# Patient Record
Sex: FEMALE | Race: WHITE | NOT HISPANIC OR LATINO | Employment: OTHER
[De-identification: names, ages, dates, MRNs, and addresses within clinical notes are randomized per-mention and may not be internally consistent; named-entity substitution may affect disease eponyms.]

---

## 2017-07-01 ENCOUNTER — HEALTH MAINTENANCE LETTER (OUTPATIENT)
Age: 52
End: 2017-07-01

## 2021-12-23 ENCOUNTER — TRANSCRIBE ORDERS (OUTPATIENT)
Dept: OTHER | Age: 56
End: 2021-12-23

## 2021-12-23 DIAGNOSIS — C25.9 PANCREATIC CANCER (H): Primary | ICD-10-CM

## 2021-12-24 ENCOUNTER — PATIENT OUTREACH (OUTPATIENT)
Dept: ONCOLOGY | Facility: CLINIC | Age: 56
End: 2021-12-24
Payer: COMMERCIAL

## 2021-12-24 NOTE — PROGRESS NOTES
Review of Oncology referral fr Dr Hamilton/ [facility] for pt with pancreatic adenocarcinoma.    12/9/21 panc mass EUS bx at [facility] confirms adenocarcinoma ductal, all imaging at [facility] (CT CAP, MRI)    Pt Med Onc & Surg Onc at [facility], neoadj chemo recommended. Cirrhotic liver (?) will see hepatology/ Dr Dempsey at [facility].     Will proceed to scheduling next available Med Onc at [facility] for pancreatic cancer (requesting Ayesha).

## 2021-12-28 NOTE — TELEPHONE ENCOUNTER
RECORDS STATUS - ALL OTHER DIAGNOSIS      RECORDS RECEIVED FROM: JOSELITO Lomeli   DATE RECEIVED:    NOTES STATUS DETAILS   OFFICE NOTE from referring provider CE - [facility]: 12/14/21   OFFICE NOTE from medical oncologist CE - [facility] Armstrong: 12/22/21   DISCHARGE SUMMARY from hospital CE  [facility] 12/9/21   DISCHARGE REPORT from the ER NA    OPERATIVE REPORT CE - [facility] 12/9/21: EUS FNA   MEDICATION LIST  [facility]   CLINICAL TRIAL TREATMENTS TO DATE     LABS     PATHOLOGY REPORTS [facility], Report in CE, slides received 1/5 12/9/21: [id number]   ANYTHING RELATED TO DIAGNOSIS Epic 12/21/21   GENONOMIC TESTING     TYPE:     IMAGING (NEED IMAGES & REPORT)     XR ERCP PACS 12/9/21: [facility]   CT SCANS PACS 12/21/21, 12/9/21, 10/29/21: [facility]    1/31/13: [facility]   MRI PACS 12/21/21: [facility]   MAMMO     ULTRASOUND PACS 12/8/21, 2/29/20: [facility]    1/29/13: [facility]   PET

## 2021-12-31 ENCOUNTER — PRE VISIT (OUTPATIENT)
Dept: ONCOLOGY | Facility: CLINIC | Age: 56
End: 2021-12-31
Payer: COMMERCIAL

## 2021-12-31 ENCOUNTER — VIRTUAL VISIT (OUTPATIENT)
Dept: ONCOLOGY | Facility: CLINIC | Age: 56
End: 2021-12-31
Attending: INTERNAL MEDICINE
Payer: COMMERCIAL

## 2021-12-31 DIAGNOSIS — C25.8 OVERLAPPING MALIGNANT NEOPLASM OF PANCREAS (H): Primary | ICD-10-CM

## 2021-12-31 PROCEDURE — 99205 OFFICE O/P NEW HI 60 MIN: CPT | Mod: 95 | Performed by: INTERNAL MEDICINE

## 2021-12-31 PROCEDURE — 999N001193 HC VIDEO/TELEPHONE VISIT; NO CHARGE

## 2021-12-31 RX ORDER — POLYETHYLENE GLYCOL 3350 17 G/17G
17 POWDER, FOR SOLUTION ORAL PRN
COMMUNITY
Start: 2021-12-14

## 2021-12-31 RX ORDER — LAMOTRIGINE 100 MG/1
100 TABLET ORAL AT BEDTIME
COMMUNITY
Start: 2021-12-15

## 2021-12-31 RX ORDER — MULTIPLE VITAMINS W/ MINERALS TAB 9MG-400MCG
1 TAB ORAL DAILY
COMMUNITY
Start: 2021-09-20

## 2021-12-31 RX ORDER — SENNOSIDES A AND B 8.6 MG/1
8.6 TABLET, FILM COATED ORAL PRN
COMMUNITY
Start: 2021-12-12

## 2021-12-31 RX ORDER — BUPROPION HYDROCHLORIDE 150 MG/1
450 TABLET ORAL DAILY
COMMUNITY
Start: 2021-12-15

## 2021-12-31 RX ORDER — CLOBETASOL PROPIONATE 0.5 MG/G
OINTMENT TOPICAL PRN
COMMUNITY
Start: 2021-05-03

## 2021-12-31 RX ORDER — SERTRALINE HYDROCHLORIDE 100 MG/1
100 TABLET, FILM COATED ORAL EVERY MORNING
COMMUNITY
Start: 2021-12-15

## 2021-12-31 RX ORDER — TRIAMCINOLONE ACETONIDE 1 MG/G
CREAM TOPICAL PRN
COMMUNITY
Start: 2021-05-03

## 2021-12-31 RX ORDER — GABAPENTIN 300 MG/1
600 CAPSULE ORAL AT BEDTIME
COMMUNITY
Start: 2021-12-12

## 2021-12-31 RX ORDER — OXYCODONE HYDROCHLORIDE 5 MG/1
5-10 TABLET ORAL EVERY 6 HOURS PRN
COMMUNITY
Start: 2021-12-17

## 2021-12-31 NOTE — PROGRESS NOTES
Service Date: 12/31/2021    VIRTUAL VISIT    MEDICAL ONCOLOGY NEW PATIENT VISIT    REFERRING PHYSICIAN:    1.  Dr. Reyna Hamilton, [place], [place].    2.  Dr. Gian Armstrong, [facility], Surgery Service.    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  The patient is referred by Dr. Hamilton for a second opinion.    HISTORY OF PRESENT ILLNESS:  Ms. Chu is a 56-year-old woman from [place].  She joins me for [employer]-based virtual video visit along with her daughter, Ashlee, who states she works in our Bone [employer]row Transplant Office here at the [facility] and is familiar with clinical trials.      Ms. Sherita Chu has a history of self-professed autoimmune disorder that she later described as psoriasis, alcoholism and she has been sober since 2013, history of bipolar disease, atrial flutter in 08/2011 while intoxicated and a question of liver failure that is being evaluated further in terms of possible cirrhosis.  She has an upcoming appointment with Dr. Dempsey at [facility] in the coming week and a half.  She was motivated to come for a second opinion for recently diagnosed pancreatic head adenocarcinoma as follows.      She developed obstructive jaundice in late November or early December.  A CT scan of the abdomen and pelvis on 12/09/2021 showed dilated common bile duct, intrahepatic bile duct and no laine pancreatic mass or cholangiocarcinoma-like mass was seen.  She was referred to Dr. Juan Antonio Lorenzo from Gastroenterology who performed endoscopy on the same day.  ERCP was done 12/09.  A bile duct brushing was negative in terms of cytology, but FNA was positive for adenocarcinoma consistent with pancreatic ductal carcinoma of the pancreatic head.  Alkaline phosphatase was elevated at 336, total bilirubin  "is mildly elevated at 1.9. On 12/12, a CA 19-9 was checked for the first time and was extremely highly elevated at 5820.  It was rechecked a week later on 12/21/2021 and again highly elevated at 5654.      She met with medical oncologist, Dr. Hamilton, on 12/14/2021 who recommended chemotherapy and evaluation by Dr. Armstrong from the Surgery Service at [facility].  Ms. Chu met with Dr. Armstrong on 12/21 and recommended up-front chemotherapy.  She has not yet started chemotherapy.  It is scheduled for on or around 12/10.  Port placement is also scheduled for the coming week.  I see in documentation that Dr. Hamilton had recommended the patient become vaccinated for COVID vaccination as Ms. Chu was applying for a petition for COVID vaccine-related examination, not otherwise specified.      Ms. Chu shares with me today she has several medical issues including mouth sores of unclear etiology.  She has neuropathy, not otherwise specified but possibly due to what she terms as a prediabetic diagnosis that is worse in her feet.  She has had it for the last few years and it is worse at night.  She describes an \"autoimmune disorder.\" When I asked her to elaborate further, she describes psoriasis, but she has not seen a rheumatologist for many years.  There is a question of possible cirrhosis that is documented in the medical record due to history of alcoholism.  She is sober since 2013 but drank heavily for about 8 years.  She has documented bipolar disorder per the outside medical records as well.  She tells me several points before becoming sober 2013 she was hospitalized for liver failure, but she did not follow up with a hepatologist.  During 1 episode of intoxication, per the medical record, had atrial flutter documented on 08/20/2021.    Past medical history/PAST SURGICAL HISTORY:  Per above, and additionally most notable for the ERCP and evaluation done as described above.    SOCIAL HISTORY:  She lives " in [place].  Her daughter, Ashlee, works in the Bone [employer]row Transplant Office at the [facility] and accompanies her today on the call.  She works as a .  She smokes about 1/4 pack a day, but at her peak, she smoked a pack a day.  Overall, she has smoked cigarettes for 34 years.  When she was drinking alcohol, she drank a maximum of a pint or 2 per day for over an 8-year period most heavily.  She quit and has been sober since 2013.  I congratulated her on her sobriety.  She smokes occasional recreational marijuana and her daughter states it has not been in the last year.  She denies any IV drug use when I asked about the potential risk factors for hepatitis B, hepatitis C, or HIV.    FAMILY HISTORY:  Family history of malignancy is notable as she says 2 people in her family have had pancreas cancer including at least 1 with metastatic burden, per the outside record.  Her mother had colon cancer.  Paternal grandmother had lymphoma.      REVIEW OF SYSTEMS:  A full 14-point review of systems was performed. Pertinent symptoms are reviewed above per HPI.    MEDICATIONS AND ALLERGIES:  Reviewed in Epic.  Current Outpatient Medications   Medication     albuterol (ALBUTEROL) 108 (90 BASE) MCG/ACT inhaler     buPROPion (WELLBUTRIN XL) 150 MG 24 hr tablet     buPROPion (WELLBUTRIN XL) 300 MG 24 hr tablet     clobetasol (TEMOVATE) 0.05 % external ointment     gabapentin (NEURONTIN) 300 MG capsule     lamoTRIgine (LAMICTAL) 100 MG tablet     multivitamin w/minerals (CERTAVITE/ANTIOXIDANTS) tablet     oxyCODONE (ROXICODONE) 5 MG tablet     polyethylene glycol (MIRALAX) 17 GM/Dose powder     senna (SENOKOT) 8.6 MG tablet     sertraline (ZOLOFT) 100 MG tablet     triamcinolone (KENALOG) 0.1 % external cream     ARIPiprazole (ABILIFY PO)     B Complex Vitamins (VITAMIN B COMPLEX PO)     cetirizine (ZYRTEC) 10 MG tablet     Citalopram Hydrobromide (CELEXA PO)     DIAZEPAM PO     ibuprofen (ADVIL,MOTRIN)  400 MG tablet     ibuprofen (ADVIL,MOTRIN) 600 MG tablet     LANsoprazole (PREVACID) 30 MG capsule     mirtazapine (REMERON) 30 MG tablet     Multiple Vitamin (MULTI-VITAMIN) per tablet     naltrexone (DEPADE;REVIA) 50 MG tablet     oxybutynin (DITROPAN) 5 MG tablet     prazosin (MINIPRESS) 2 MG capsule     QUEtiapine (SEROQUEL) 25 MG tablet     Topiramate (TOPAMAX PO)     traZODone (DESYREL) 100 MG tablet     No current facility-administered medications for this visit.        Allergies   Allergen Reactions     Nka [No Known Allergies]      Zolpidem Other (See Comments)     Delirium         PHYSICAL EXAMINATION: Physical exam could not be performed today in context of a Virtual Visit during the COVID19/Coronavirus pandemic.  Vitals - Patient Reported  Weight (Patient Reported): 95.3 kg (210 lb)  Pain Score: No Pain (0)         Observed physical assessments made today by visualizing the patient by video link:    General/Constitutional: Generally appears well, not acutely ill.  HEENT: no scleral icterus, not jaundiced [initial presentation of jaundice was reported to have been alleviated post-stent placement].  Respiratory: no labored breathing.  Musculoskeletal: appears to have full range of motion and adequate physical strength.  Skin: no jaundice, discoloration or other notable lesions.  Neurological: no evidence of tremors.  Psychiatric: no evident anxiety; fully alert and oriented with fluent speech.      The rest of a comprehensive physical examination is deferred due to PHE (public health emergency) video visit restrictions.    Pertinent labs, pathology and imaging were reviewed by me through Care Everywhere system including notes by referring physicians, Dr. Hamilton and Dr. Armstrong, CA  19-9 levels provided above.  The CT and MRI abdomen was further done to elaborate and clarify whether or not she has cirrhosis.  The MRI abdomen done on 12/21 also, interestingly, could not very well visualize the by then  biopsied pancreatic head mass.  Interval placement of distal common bile duct was seen and the obstruction has been resolved by that point.  There was no evidence of metastatic disease in the abdomen or other suspicious liver lesions noted.    ASSESSMENT AND PLAN:  Ms. Sherita Chu is a 56-year-old woman from [place] with a history of alcoholism, possible issues with liver dysfunction, possible autoimmune disorder such as psoriasis and underlying neuropathy from what she describes as prediabetes.  She has a fair performance status I would characterize as probably an ECOG 1.      She comes for a second opinion regarding newly diagnosed pancreatic head adenocarcinoma that, despite imaging, does not show any laine evidence of distant metastatic disease and the tumor does appear resectable.  There is a highly elevated CA 19-9 above 5000 which in my view presents strong suspicion that she has occult distant metastatic disease not visualized on the scans.  I reviewed the natural course, biology and treatment of pancreatic adenocarcinomas in general.  I explained only 5%-10% of them are potentially hereditary in nature.  Other risk factors include, in her history in particular, smoking and alcohol abuse, potentially other issues and I am not certain how the prediabetes would fit there.  If she has truly neuropathy, I would suspect she may even have full blown diabetes, but regarding noncancer related issues today and other questions she had regarding GGT and other liver function tests, I will steer her towards her primary care physician, Dr. Hamilton or the upcoming appointment with the liver specialist at [facility].     In terms of answering questions about general strategy and chemotherapy regimens, I reviewed that, for potentially resectable carcinomas of the pancreas, the standard of care established by the initial CONKO trial was surgical resection followed by risk reduction-intent chemotherapy in the  postoperative adjuvant setting.  Traditionally, it has been gemcitabine and in the more recent years has grown to include options of gemcitabine with oral capecitabine or FOLFIRINOX.      However, in the last 5-10 years, many subspecialty centers including ours are giving more chemotherapy up front as a way to ensure that chemotherapy is given, citing as much as high as 40% risk factor of complication for Whipple procedures and also to help increase the rates of R0 resection.  I think that would be even more wise in this case and justified due to the high CA 19-9 that, barring any significant or acute pancreatitis or other issues at the time those labs were drawn, may indicate occult metastatic disease in which case the patient would not benefit from any form of surgical resection with intent to cure but rather chemotherapy with palliative intent.  I described that the regimens are borrowed from experience in the stage IV metastatic setting including FOLFIRINOX or gemcitabine and nab-paclitaxel. Due to her history of oral mouth sores, neuropathy and other medical issues, I would suggest not going with FOLFIRINOX as initial risk factor of neuropathy with oxaliplatin which induces cold sensory neuropathy and risk of steatohepatitis and steatosis that may occur by a prolonged exposure to oxaliplatin.  I would favor gemcitabine and nab-paclitaxel regimen.  I explained per the MPACT Trial has been FDA approved for use in stage IV pancreas carcinomas and often used in the neoadjuvant setting.  Although the regimen is given on days 1, 8, 15 of a 28-day cycle, the presentation at the GI ASCO Symposium in 01/2015 by Dr. Pablo Madrigal, at the time at [facility] and now [facility], showed that dropping day 8 does not compromise efficacy but actually makes for much more tolerable form of the regimen.     For that reason, since that time, I routinely have been administering gemcitabine and nab-paclitaxel  on days 1 and 15 and not on day 8.  Thus, it is given every 14 days; gemcitabine at standard dose of 1000 mg/m2; nab-paclitaxel at 125 mg/m2.  I did  that there would be risk of myelosuppression, increased risk of infection, increased risk of bleeding due to thrombocytopenia that may be induced by chemotherapy, risk of exacerbation of her underlying mouth sores, rashes and nab-paclitaxel also having some potential to induce or exacerbate underlying neuropathy.  I reviewed all of the above.      She has an upcoming 01/10/2022 appointment with Dr. Hamilton, her primary oncologist, after meeting with the hepatologist there.  She will return to the care of Dr. Hamilton and Dr. Armstrong.  I appreciate the opportunity to meet this kind woman.  I answered their questions as best I could.  I referred them to cancer.net and [employer] which are excellent online resources for the lay public.  Thank you for the referral.  I would be happy to meet with her again at any time.      VIRTUAL VISIT - DETAILS:    I have reviewed the note as documented above. This accurately captures the substance of my conversation with the patient.    Date of call: December 31, 2021   Start of call: 7:39 am  End of call: 8:20 am    Provider location: [facility] (academic office)  Patient location: Home      Mode of Video Visit: [facility]             I spent 40 minutes in consultation, including history and discussion with the patient including review of recent lab values and radiologic imaging results.  An additional 25 minutes was spent on the day of the visit, including reviewing pertinent medical notes and documentation from other physicians and APPs who have evaluated and treated this patient, pertinent lab values, pathology and imaging results, personal review of radiologic images, discussing the case with referring providers and/or nurse coordinator team, post-visit orders, and all post-visit documentation.    Nicholas Carey MD  PhD          D: 2021   T: 2021   MT: sandy    Name:     ZHANNA RUEDA  MRN:      [id number]        Account:      [id number]   :      1965           Service Date: 2021       Document: [id number]

## 2021-12-31 NOTE — LETTER
12/31/2021         RE: Sherita Chu  [address]  [place] [address]        Dear Colleague,    Thank you for referring your patient, Sherita Chu, to the [facility]. Please see a copy of my visit note below.    Service Date: 12/31/2021    VIRTUAL VISIT    MEDICAL ONCOLOGY NEW PATIENT VISIT    REFERRING PHYSICIAN:    1.  Dr. Reyna Hamilton, [place], [place].    2.  Dr. Gian Armstrong, [facility], Surgery Service.    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  The patient is referred by Dr. Hamilton for a second opinion.    HISTORY OF PRESENT ILLNESS:  Ms. Chu is a 56-year-old woman from [place].  She joins me for [employer]-based virtual video visit along with her daughter, Ashlee, who states she works in our Bone [employer] Transplant Office here at the [employer] and is familiar with clinical trials.      Ms. Sherita Chu has a history of self-professed autoimmune disorder that she later described as psoriasis, alcoholism and she has been sober since 2013, history of bipolar disease, atrial flutter in 08/2011 while intoxicated and a question of liver failure that is being evaluated further in terms of possible cirrhosis.  She has an upcoming appointment with Dr. Dempsey at [facility] in the coming week and a half.  She was motivated to come for a second opinion for recently diagnosed pancreatic head adenocarcinoma as follows.      She developed obstructive jaundice in late November or early December.  A CT scan of the abdomen and pelvis on 12/09/2021 showed dilated common bile duct, intrahepatic bile duct and no laine pancreatic mass or cholangiocarcinoma-like mass was seen.  She was referred to Dr. Juan Antonio Lorenzo from Gastroenterology who performed  "endoscopy on the same day.  ERCP was done 12/09.  A bile duct brushing was negative in terms of cytology, but FNA was positive for adenocarcinoma consistent with pancreatic ductal carcinoma of the pancreatic head.  Alkaline phosphatase was elevated at 336, total bilirubin is mildly elevated at 1.9. On 12/12, a CA 19-9 was checked for the first time and was extremely highly elevated at 5820.  It was rechecked a week later on 12/21/2021 and again highly elevated at 5654.      She met with medical oncologist, Dr. Hamilton, on 12/14/2021 who recommended chemotherapy and evaluation by Dr. Armstrong from the Surgery Service at [facility].  Ms. Chu met with Dr. Armstrong on 12/21 and recommended up-front chemotherapy.  She has not yet started chemotherapy.  It is scheduled for on or around 12/10.  Port placement is also scheduled for the coming week.  I see in documentation that Dr. Hamilton had recommended the patient become vaccinated for COVID vaccination as Ms. Chu was applying for a petition for COVID vaccine-related examination, not otherwise specified.      Ms. Chu shares with me today she has several medical issues including mouth sores of unclear etiology.  She has neuropathy, not otherwise specified but possibly due to what she terms as a prediabetic diagnosis that is worse in her feet.  She has had it for the last few years and it is worse at night.  She describes an \"autoimmune disorder.\" When I asked her to elaborate further, she describes psoriasis, but she has not seen a rheumatologist for many years.  There is a question of possible cirrhosis that is documented in the medical record due to history of alcoholism.  She is sober since 2013 but drank heavily for about 8 years.  She has documented bipolar disorder per the outside medical records as well.  She tells me several points before becoming sober 2013 she was hospitalized for liver failure, but she did not follow up with a hepatologist.  " During 1 episode of intoxication, per the medical record, had atrial flutter documented on 08/20/2021.    Past medical history/PAST SURGICAL HISTORY:  Per above, and additionally most notable for the ERCP and evaluation done as described above.    SOCIAL HISTORY:  She lives in [place].  Her daughter, Ashlee, works in the Bone Marrow Transplant Office at the [employer] and accompanies her today on the call.  She works as a .  She smokes about 1/4 pack a day, but at her peak, she smoked a pack a day.  Overall, she has smoked cigarettes for 34 years.  When she was drinking alcohol, she drank a maximum of a pint or 2 per day for over an 8-year period most heavily.  She quit and has been sober since 2013.  I congratulated her on her sobriety.  She smokes occasional recreational marijuana and her daughter states it has not been in the last year.  She denies any IV drug use when I asked about the potential risk factors for hepatitis B, hepatitis C, or HIV.    FAMILY HISTORY:  Family history of malignancy is notable as she says 2 people in her family have had pancreas cancer including at least 1 with metastatic burden, per the outside record.  Her mother had colon cancer.  Paternal grandmother had lymphoma.      REVIEW OF SYSTEMS:  A full 14-point review of systems was performed. Pertinent symptoms are reviewed above per HPI.    MEDICATIONS AND ALLERGIES:  Reviewed in Epic.  Current Outpatient Medications   Medication     albuterol (ALBUTEROL) 108 (90 BASE) MCG/ACT inhaler     buPROPion (WELLBUTRIN XL) 150 MG 24 hr tablet     buPROPion (WELLBUTRIN XL) 300 MG 24 hr tablet     clobetasol (TEMOVATE) 0.05 % external ointment     gabapentin (NEURONTIN) 300 MG capsule     lamoTRIgine (LAMICTAL) 100 MG tablet     multivitamin w/minerals (CERTAVITE/ANTIOXIDANTS) tablet     oxyCODONE (ROXICODONE) 5 MG tablet     polyethylene glycol (MIRALAX) 17 GM/Dose powder     senna (SENOKOT) 8.6 MG tablet      sertraline (ZOLOFT) 100 MG tablet     triamcinolone (KENALOG) 0.1 % external cream     ARIPiprazole (ABILIFY PO)     B Complex Vitamins (VITAMIN B COMPLEX PO)     cetirizine (ZYRTEC) 10 MG tablet     Citalopram Hydrobromide (CELEXA PO)     DIAZEPAM PO     ibuprofen (ADVIL,MOTRIN) 400 MG tablet     ibuprofen (ADVIL,MOTRIN) 600 MG tablet     LANsoprazole (PREVACID) 30 MG capsule     mirtazapine (REMERON) 30 MG tablet     Multiple Vitamin (MULTI-VITAMIN) per tablet     naltrexone (DEPADE;REVIA) 50 MG tablet     oxybutynin (DITROPAN) 5 MG tablet     prazosin (MINIPRESS) 2 MG capsule     QUEtiapine (SEROQUEL) 25 MG tablet     Topiramate (TOPAMAX PO)     traZODone (DESYREL) 100 MG tablet     No current facility-administered medications for this visit.        Allergies   Allergen Reactions     Nka [No Known Allergies]      Zolpidem Other (See Comments)     Delirium         PHYSICAL EXAMINATION: Physical exam could not be performed today in context of a Virtual Visit during the COVID19/Coronavirus pandemic.  Vitals - Patient Reported  Weight (Patient Reported): 95.3 kg (210 lb)  Pain Score: No Pain (0)         Observed physical assessments made today by visualizing the patient by video link:    General/Constitutional: Generally appears well, not acutely ill.  HEENT: no scleral icterus, not jaundiced [initial presentation of jaundice was reported to have been alleviated post-stent placement].  Respiratory: no labored breathing.  Musculoskeletal: appears to have full range of motion and adequate physical strength.  Skin: no jaundice, discoloration or other notable lesions.  Neurological: no evidence of tremors.  Psychiatric: no evident anxiety; fully alert and oriented with fluent speech.      The rest of a comprehensive physical examination is deferred due to PHE (public health emergency) video visit restrictions.    Pertinent labs, pathology and imaging were reviewed by me through Care Everywhere system including notes by  referring physicians, Dr. Hamilton and Dr. Armstrong, CA  19-9 levels provided above.  The CT and MRI abdomen was further done to elaborate and clarify whether or not she has cirrhosis.  The MRI abdomen done on 12/21 also, interestingly, could not very well visualize the by then biopsied pancreatic head mass.  Interval placement of distal common bile duct was seen and the obstruction has been resolved by that point.  There was no evidence of metastatic disease in the abdomen or other suspicious liver lesions noted.    ASSESSMENT AND PLAN:  Ms. Sherita Chu is a 56-year-old woman from [place] with a history of alcoholism, possible issues with liver dysfunction, possible autoimmune disorder such as psoriasis and underlying neuropathy from what she describes as prediabetes.  She has a fair performance status I would characterize as probably an ECOG 1.      She comes for a second opinion regarding newly diagnosed pancreatic head adenocarcinoma that, despite imaging, does not show any laine evidence of distant metastatic disease and the tumor does appear resectable.  There is a highly elevated CA 19-9 above 5000 which in my view presents strong suspicion that she has occult distant metastatic disease not visualized on the scans.  I reviewed the natural course, biology and treatment of pancreatic adenocarcinomas in general.  I explained only 5%-10% of them are potentially hereditary in nature.  Other risk factors include, in her history in particular, smoking and alcohol abuse, potentially other issues and I am not certain how the prediabetes would fit there.  If she has truly neuropathy, I would suspect she may even have full blown diabetes, but regarding noncancer related issues today and other questions she had regarding GGT and other liver function tests, I will steer her towards her primary care physician, Dr. Hamilton or the upcoming appointment with the liver specialist at [facility].     In terms of  answering questions about general strategy and chemotherapy regimens, I reviewed that, for potentially resectable carcinomas of the pancreas, the standard of care established by the initial CONKO trial was surgical resection followed by risk reduction-intent chemotherapy in the postoperative adjuvant setting.  Traditionally, it has been gemcitabine and in the more recent years has grown to include options of gemcitabine with oral capecitabine or FOLFIRINOX.      However, in the last 5-10 years, many subspecialty centers including ours are giving more chemotherapy up front as a way to ensure that chemotherapy is given, citing as much as high as 40% risk factor of complication for Whipple procedures and also to help increase the rates of R0 resection.  I think that would be even more wise in this case and justified due to the high CA 19-9 that, barring any significant or acute pancreatitis or other issues at the time those labs were drawn, may indicate occult metastatic disease in which case the patient would not benefit from any form of surgical resection with intent to cure but rather chemotherapy with palliative intent.  I described that the regimens are borrowed from experience in the stage IV metastatic setting including FOLFIRINOX or gemcitabine and nab-paclitaxel. Due to her history of oral mouth sores, neuropathy and other medical issues, I would suggest not going with FOLFIRINOX as initial risk factor of neuropathy with oxaliplatin which induces cold sensory neuropathy and risk of steatohepatitis and steatosis that may occur by a prolonged exposure to oxaliplatin.  I would favor gemcitabine and nab-paclitaxel regimen.  I explained per the MPACT Trial has been FDA approved for use in stage IV pancreas carcinomas and often used in the neoadjuvant setting.  Although the regimen is given on days 1, 8, 15 of a 28-day cycle, the presentation at the GI ASCO Symposium in 01/2015 by Dr. Pablo Madrigal, at the time  at [facility] and now [facility], showed that dropping day 8 does not compromise efficacy but actually makes for much more tolerable form of the regimen.     For that reason, since that time, I routinely have been administering gemcitabine and nab-paclitaxel on days 1 and 15 and not on day 8.  Thus, it is given every 14 days; gemcitabine at standard dose of 1000 mg/m2; nab-paclitaxel at 125 mg/m2.  I did  that there would be risk of myelosuppression, increased risk of infection, increased risk of bleeding due to thrombocytopenia that may be induced by chemotherapy, risk of exacerbation of her underlying mouth sores, rashes and nab-paclitaxel also having some potential to induce or exacerbate underlying neuropathy.  I reviewed all of the above.      She has an upcoming 01/10/2022 appointment with Dr. Hamilton, her primary oncologist, after meeting with the hepatologist there.  She will return to the care of Dr. Hamilton and Dr. Armstrong.  I appreciate the opportunity to meet this kind woman.  I answered their questions as best I could.  I referred them to cancer.net and [employer]IO[employer].org which are excellent online resources for the lay public.  Thank you for the referral.  I would be happy to meet with her again at any time.      VIRTUAL VISIT - DETAILS:    I have reviewed the note as documented above. This accurately captures the substance of my conversation with the patient.    Date of call: December 31, 2021   Start of call: 7:39 am  End of call: 8:20 am    Provider location: [facility] (academic office)  Patient location: Home      Mode of Video Visit: [facility]             I spent 40 minutes in consultation, including history and discussion with the patient including review of recent lab values and radiologic imaging results.  An additional 25 minutes was spent on the day of the visit, including reviewing pertinent medical notes and documentation from other physicians and APPs who have  evaluated and treated this patient, pertinent lab values, pathology and imaging results, personal review of radiologic images, discussing the case with referring providers and/or nurse coordinator team, post-visit orders, and all post-visit documentation.    Nicholas Carey MD PhD          D: 2021   T: 2021   MT: sandy    Name:     ZHANNA RUEDA  MRN:      [id number]        Account:      [id number]   :      1965           Service Date: 2021       Document: [id number]

## 2021-12-31 NOTE — PROGRESS NOTES
Sherita is a 56 year old who is being evaluated via a billable video visit.      How would you like to obtain your AVS? MyChart  If the video visit is dropped, the invitation should be resent by: Text to cell phone: [phone]  Will anyone else be joining your video visit? Gwen GAMINO

## 2022-01-01 ENCOUNTER — HEALTH MAINTENANCE LETTER (OUTPATIENT)
Age: 57
End: 2022-01-01

## 2022-01-05 ENCOUNTER — LAB (OUTPATIENT)
Dept: LAB | Facility: CLINIC | Age: 57
End: 2022-01-05

## 2022-01-05 DIAGNOSIS — C25.9 MALIGNANT NEOPLASM OF PANCREAS (H): Primary | ICD-10-CM

## 2022-01-05 PROCEDURE — 88321 CONSLTJ&REPRT SLD PREP ELSWR: CPT | Performed by: PATHOLOGY

## 2022-01-07 LAB
PATH REPORT.COMMENTS IMP SPEC: NORMAL
PATH REPORT.COMMENTS IMP SPEC: NORMAL
PATH REPORT.FINAL DX SPEC: NORMAL
PATH REPORT.GROSS SPEC: NORMAL
PATH REPORT.RELEVANT HX SPEC: NORMAL
PATH REPORT.RELEVANT HX SPEC: NORMAL
PATH REPORT.SITE OF ORIGIN SPEC: NORMAL

## 2022-02-06 ENCOUNTER — HEALTH MAINTENANCE LETTER (OUTPATIENT)
Age: 57
End: 2022-02-06

## 2023-01-01 ENCOUNTER — HEALTH MAINTENANCE LETTER (OUTPATIENT)
Age: 58
End: 2023-01-01